# Patient Record
Sex: FEMALE | Race: BLACK OR AFRICAN AMERICAN
[De-identification: names, ages, dates, MRNs, and addresses within clinical notes are randomized per-mention and may not be internally consistent; named-entity substitution may affect disease eponyms.]

---

## 2019-06-30 ENCOUNTER — HOSPITAL ENCOUNTER (EMERGENCY)
Dept: HOSPITAL 74 - JER | Age: 29
Discharge: HOME | End: 2019-06-30
Payer: COMMERCIAL

## 2019-06-30 VITALS — SYSTOLIC BLOOD PRESSURE: 110 MMHG | TEMPERATURE: 98.3 F | HEART RATE: 57 BPM | DIASTOLIC BLOOD PRESSURE: 58 MMHG

## 2019-06-30 VITALS — BODY MASS INDEX: 23.8 KG/M2

## 2019-06-30 DIAGNOSIS — R07.0: Primary | ICD-10-CM

## 2019-06-30 NOTE — PDOC
*Physical Exam





- Vital Signs


 Last Vital Signs











Temp Pulse Resp BP Pulse Ox


 


 98.3 F   57 L  20   110/58 L  100 


 


 06/30/19 05:38  06/30/19 05:38  06/30/19 05:38  06/30/19 05:38  06/30/19 05:38














Medical Decision Making





- Medical Decision Making





06/30/19 07:16


Patient received in signout from VIVIANE Brewster. Patient here with complaints of 

difficulty swallowing and feeling of something in her throat. Patient pending 

reading of soft tissue neck and chest x-ray patient otherwise resting and 

breathing without difficulty


06/30/19 07:17


X-ray of soft tissue neck shows no sign of tonsillar adenoidal enlargement. The 

epiglottis is not enlarged. There is no sign of foreign body or soft tissue air.


Chest x-ray negative for acute chest pathology. Will discharge patient home 

with recommendations to eat soft foods gargle with warm salt water and take 

Motrin for discomfort





*DC/Admit/Observation/Transfer


Diagnosis at time of Disposition: 


 Throat pain








- Discharge Dispostion


Disposition: HOME


Condition at time of disposition: Good





- Referrals





- Patient Instructions


Printed Discharge Instructions:  Sore Throat


Additional Instructions: 


Your x-rays were negative and so I recommend to eat soft foods, gargle with 

warm salt wate,r and take Motrin for discomfort





- Post Discharge Activity

## 2019-06-30 NOTE — PDOC
History of Present Illness





- General


Chief Complaint: Sore Throat


Stated Complaint: DIFFICULTY SWALLOWING


Time Seen by Provider: 19 05:44


History Source: Patient


Exam Limitations: No Limitations





- History of Present Illness


Initial Comments: 





19 06:22


Patient is a 29 year old female with no pmhx c/o sore throat x 5 days.  States 

she had a little scratchy throat, and felt like there was a lump in the throat. 

Initially thought it was her sinuses, however this morning woke up felt that 

the lump got bigger and she was having some difficulty breathing.  States she 

had a cough a few days ago however has not been coughing today. Her 

vaccinations are up-to-date. Denies fever, chills.








PMD:  at [address] [place]


PMHX:  as above


PSOCHX:  neg etoh, drug, cig


ALL:  NKDA








GENERAL/CONSTITUTIONAL: No fever or chills. No weakness. No weight change.


HEAD, EYES, EARS, NOSE AND THROAT: No change in vision. No ear pain or 

discharge. No sore throat.


CARDIOVASCULAR: No chest pain or shortness of breath.


RESPIRATORY: No cough, wheezing, or hemoptysis.


GASTROINTESTINAL: No nausea, vomiting, diarrhea or constipation. No rectal 

bleeding.


GENITOURINARY: No dysuria, frequency, or change in urination.


MUSCULOSKELETAL: No joint or muscle swelling or pain. No neck or back pain.


SKIN AND BREASTS: No rash or easy bruising.


NEUROLOGIC: No headache, vertigo, loss of consciousness, or loss of sensation.


PSYCHIATRIC: No depression or anxiety.


ENDOCRINE: No increased thirst. No abnormal weight change.


HEMATOLOGIC/LYMPHATIC: No anemia, easy bleeding, or history of blood clots.


ALLERGIC/IMMUNOLOGIC: No hives or skin allergy. No latex allergy.





GENERAL: The patient is awake, alert, and fully oriented, in no acute distress.


HEAD: Normal with no signs of trauma.


EYES: Pupils equal, round and reactive to light, extraocular movements intact, 

sclera anicteric, conjunctiva clear.


ENT: Ears normal, nares patent, oropharynx clear without exudates.  Moist 

mucous membranes.


NECK: Normal range of motion, supple without lymphadenopathy, JVD, or masses, 

no thyromegaly.


LUNGS: Breath sounds equal, clear to auscultation bilaterally.  No wheezes, and 

no crackles.


HEART: Regular rate and rhythm, normal S1 and S2 without murmur, rub.


ABDOMEN: Soft, nontender, normoactive bowel sounds.  No guarding, no rebound.  

No masses.


EXTREMITIES: Normal range of motion, no edema.  No clubbing or cyanosis. No 

cords, erythema, or tenderness.


NEUROLOGICAL: Cranial nerves II through XII grossly intact.  Normal speech, 

normal gait.


PSYCH: Normal mood, normal affect.


SKIN: Warm, Dry, normal turgor, no rashes or lesions noted.











Past History





- Past Medical History


Allergies/Adverse Reactions: 


 Allergies











Allergy/AdvReac Type Severity Reaction Status Date / Time


 


No Known Allergies Allergy   Verified 19 05:37











Home Medications: 


Ambulatory Orders





Prenatal Vitamins ([facility]) - 1 tab PO DAILY 07/19/15 


Iron 325 mg PO DAILY 16 








Asthma: No


Cancer: No


Cardiac Disorders: No


COPD: No


Diabetes: No


HTN: No


Seizures: No


Thyroid Disease: No





- Reproductive History


 (#): 1


Para: 0


Therapeutic (s) & number: No


Spontaneous : 0





- Suicide/Smoking/Psychosocial Hx


Smoking History: Never smoked


Have you smoked in the past 12 months: No


Hx Alcohol Use: No


Drug/Substance Use Hx: No


Hx Substance Use Treatment: No





*Physical Exam





- Vital Signs


 Last Vital Signs











Temp Pulse Resp BP Pulse Ox


 


 98.3 F   57 L  20   110/58 L  100 


 


 19 05:38  19 05:38  19 05:38  19 05:38  19 05:38














Medical Decision Making





- Medical Decision Making





19 06:22


Patient is a 29 year old female with no pmhx c/o sore throat x 5 days.  States 

she had a little scratchy throat, and felt like there was a lump in the throat. 

Initially thought it was her sinuses, however this morning woke up felt that 

the lump got bigger and she was having some difficulty breathing.  States she 

had a cough a few days ago however has not been coughing today. Her 

vaccinations are up-to-date. Denies fever, chills.





DDX: Epiglottitis, mass and throat


Soft tissue neck negative then we will do a more extensive workup, including 

labs and CT of the neck.


Endorsed to the a.m. team, pending results from imaging on call.





*DC/Admit/Observation/Transfer


Diagnosis at time of Disposition: 


 Throat pain








- Discharge Dispostion


Condition at time of disposition: Stable





- Referrals





- Patient Instructions





- Post Discharge Activity